# Patient Record
Sex: MALE | Race: WHITE
[De-identification: names, ages, dates, MRNs, and addresses within clinical notes are randomized per-mention and may not be internally consistent; named-entity substitution may affect disease eponyms.]

---

## 2019-10-23 ENCOUNTER — HOSPITAL ENCOUNTER (OUTPATIENT)
Dept: HOSPITAL 35 - PAIN | Age: 64
End: 2019-10-23
Attending: ANESTHESIOLOGY
Payer: COMMERCIAL

## 2019-10-23 VITALS — SYSTOLIC BLOOD PRESSURE: 131 MMHG | DIASTOLIC BLOOD PRESSURE: 88 MMHG

## 2019-10-23 VITALS — BODY MASS INDEX: 27.83 KG/M2 | WEIGHT: 210 LBS | HEIGHT: 72.99 IN

## 2019-10-23 DIAGNOSIS — M12.88: ICD-10-CM

## 2019-10-23 DIAGNOSIS — M54.5: Primary | ICD-10-CM

## 2019-10-23 DIAGNOSIS — Z88.8: ICD-10-CM

## 2019-10-23 DIAGNOSIS — Z79.899: ICD-10-CM

## 2019-10-23 NOTE — NUR
Pain Clinic Assessment:
 
1. History of Osteoarthritis:
SPINE
HANDS
   History of Rheumatoid Arthritis:
Not Applicable
 
2. Height: 6 ft. 1 in. 185.4 cm.
   Weight: 210.0 lb.  oz. 95.256 kg.
   Patient's BMI: 27.7
 
3. Vital Signs:
   BP: 131/88 Pulse: 95 Resp: 16
   Temp:  02 Sat: 97 ECG Mon:
 
4. Pain Intensity: 7
 
5. Fall Risk:
   Dizziness: N  Needs help standing or walking: N
   Fallen in the last 3 months: N
   Fall risk comments:
 
 
6. Patient on Blood Thinner: None
 
7. History of Hypertension: N
 
8. Opioid Therapy greater than 6 weeks: N
   Opiate Contract Signed:
 
9. Risk Assessment Tool Provided: LOW-0
 
10. Functional Assessment Tool: 35/70
 
11. Recreational Drug Use: Never Drug Type:
    Tobacco Use: Never Smoker Tobacco Type:
       Amount or Packs/day:  How Many Years:
    Alcohol Use: Yes  Frequency: Weekly Quant: 1

## 2019-11-12 ENCOUNTER — HOSPITAL ENCOUNTER (OUTPATIENT)
Dept: HOSPITAL 35 - PAIN | Age: 64
Discharge: HOME | End: 2019-11-12
Attending: ANESTHESIOLOGY
Payer: COMMERCIAL

## 2019-11-12 VITALS — HEIGHT: 72.99 IN | BODY MASS INDEX: 28.63 KG/M2 | WEIGHT: 216 LBS

## 2019-11-12 VITALS — SYSTOLIC BLOOD PRESSURE: 141 MMHG | DIASTOLIC BLOOD PRESSURE: 93 MMHG

## 2019-11-12 DIAGNOSIS — M51.36: ICD-10-CM

## 2019-11-12 DIAGNOSIS — G89.29: ICD-10-CM

## 2019-11-12 DIAGNOSIS — Z88.2: ICD-10-CM

## 2019-11-12 DIAGNOSIS — M47.896: ICD-10-CM

## 2019-11-12 DIAGNOSIS — Z88.0: ICD-10-CM

## 2019-11-12 DIAGNOSIS — M54.5: Primary | ICD-10-CM

## 2019-11-12 NOTE — NUR
Pain Clinic Assessment:
 
1. History of Osteoarthritis:
SPINE
HANDS
   History of Rheumatoid Arthritis:
Not Applicable
 
2. Height: 6 ft. 1 in. 185.4 cm.
   Weight: 216.0 lb.  oz. 97.977 kg.
   Patient's BMI: 28.5
 
3. Vital Signs:
   BP: 141/93 Pulse: 95 Resp: 16
   Temp:  02 Sat: 97 ECG Mon:
 
4. Pain Intensity: 7-WITH ACTIVITY
 
5. Fall Risk:
   Dizziness: N  Needs help standing or walking: N
   Fallen in the last 3 months: N
   Fall risk comments:
 
 
6. Patient on Blood Thinner: None
 
7. History of Hypertension: N
 
8. Opioid Therapy greater than 6 weeks: N
   Opiate Contract Signed:
 
9. Risk Assessment Tool Provided: LOW-0
 
10. Functional Assessment Tool: 35/70
 
11. Recreational Drug Use: Never Drug Type:
    Tobacco Use: Never Smoker Tobacco Type:
       Amount or Packs/day:  How Many Years:
    Alcohol Use: Yes  Frequency:  Quant:

## 2019-11-12 NOTE — HPC
[facility]
[place]   [address]                     PAIN MANAGEMENT CONSULTATION  
_______________________________________________________________________________
 
Name:       LIZETH QUAN CAMELIA                  Room #:                     REG BRENNON 
OLIVER.#:      [id number]                       Account #:      [id number]  
Admission:  10/23/19    Attend Phys:    Yonatan Mcelroy DO  
Discharge:              Date of Birth:  [birth year]  
                                                          Report #: [id number]
                                                                    [id number]   
_______________________________________________________________________________
THIS REPORT FOR:   //name//                          
 
CC: DAKOTAH Ordonez 
    Physician staff
 
DATE OF SERVICE:  10/23/2019
 
 
CHIEF COMPLAINT:  Axial back pain.
 
HISTORY OF PRESENT ILLNESS:  As you know, the patient is a very pleasant
64-year-old male, who has had a longstanding history of axial back pain.  His
pain exacerbated in 05/2018.  He denies any specific injury or trauma that may
have led to symptom development.  He sought evaluation through Dr. Jose Ordonez's
office with [facility] and it was determined that the patient may be
experiencing discogenic pain.  He was subsequently referred to our clinic to
undergo disk block at L4-L5.  If this is unsuccessful alleviating symptoms, then
undergo a disk block at L5-S1.  The patient indicates that his pain may have
initially started after a "bad golf swing" and has just progressively worsened. 
He has been referred to our service to discuss the disk block injections.  He
has undergone lumbar epidural injections with Dr. Ulises Haas, but with no
lasting benefit.  The patient was referred to our clinic then to undergo disk
blocks.
 
PAST MEDICAL HISTORY:
1.  Thyroid nodule.
2.  Hypercholesterolemia.
3.  Osteoarthritis.
4.  Hypogonadism.
5.  Erectile dysfunction.
6.  Parkinson's disease.
7.  Obstructive sleep apnea.
8.  Osteoarthritis.
9.  Chronic back pain.
 
PAST SURGICAL HISTORY:  Right shoulder surgery x 4, elbow surgery x 2 on the
right.
 
ALLERGIES:  SULFA AND PENICILLIN.
 
CURRENT MEDICATIONS:  Simvastatin 40 mg once a day, carbidopa/levodopa 25/100 mg
t.i.d., famotidine 40 mg per day, nabumetone 750 mg twice a day, vitamin B12
1000 mcg per day, vitamin D3 1000 units per day, Cialis 20 mg p.r.n.,
testosterone injectable once every 2 weeks, AndroGel pump used once a day.
 
 
 
[facility]
[address]
[place]   [address]                     PAIN MANAGEMENT CONSULTATION  
_______________________________________________________________________________
 
Name:       QUAN,LIZETHMARTÍN DENISE                  Room #:                     [facility].#:      [id number]                       Account #:      [id number]  
Admission:  10/23/19    Attend Phys:    Yonatan Mcelroy DO  
Discharge:              Date of Birth:  [birth year]  
                                                          Report #: [id number]
                                                                    [id number]   
_______________________________________________________________________________
 
REVIEW OF SYSTEMS:  Positive for wearing corrective eyewear, chronic sinus
problems with rhinitis, frequent urination, nocturia, change of force or stream
urination, chronic low back pain.  Heat and cold intolerance.  All other review
of systems negative per 12-point review of systems other than those listed in
history of present illness.
 
Pain impact score 35/70 indicating moderate interference of daily activities
secondary to pain.
 
IMAGING:  MRI lumbar spine obtained 08/20/2019 shows L1-L2 unremarkable, L2-L3
unremarkable,L3-L4 shows circumferential annular disk bulge, no significant
central canal neural foraminal stenosis, minimal facet arthropathy, L4-L5
degenerative end-plate changes, circumferential annular bulge, narrowing of the
neural foramen with encroachment on the lateral recess, minimal facet
hypertrophy, thecal sac measures 1.1 cm, L5-S1 circumferential disk bulge,
central annular tear without protrusion, bulging annulus abuts, the anterior
thecal sac causing narrowing of the neural foramen, right greater than left. 
There appears to be abutment of the exiting L5 nerve root on the right.  Thecal
sac measures 1.5 cm AP.
 
PHYSICAL EXAMINATION:
VITAL SIGNS:  Blood pressure 133/88, pulse is 95, respiratory rate 16 and
unlabored.  The patient is 97% on room air, height 6 feet 1 inch tall, weight
210 pounds, BMI calculated 27.7.
GENERAL:  Well-developed, well-nourished, well-hydrated 64-year-old male.  He
appears his stated age.  He is in no acute distress, awake, alert and oriented x
3.  Current pain score is rated at 7/10.
HEENT:  Normocephalic, atraumatic.  Pupils equal, round, reactive to light. 
Extraocular muscles are intact.  Sclerae nonicteric without injection.
NEUROLOGIC:  Cranial nerves 2-12 grossly intact.  Speech fluent.  The patient
deemed a good historian.
LUNGS:  Clear, no wheeze, rhonchi or rales.
CARDIOVASCULAR:  Regular.  No appreciable gallop, no rub.
ABDOMEN:  Soft, nontender, nondistended, normoactive bowel sounds.
EXTREMITIES:  Show no clubbing, no cyanosis, and no edema.
MUSCULOSKELETAL:  The patient does has a slight pill-rolling type movement in
his upper extremities, right greater than left.  He has equal and symmetrical
lower extremity strength 5/5, intact to light touch from L1 through S2
dermatomes.  Deep tendon reflexes 2+/4 at patella and Achilles.  Ankle clonus
negative.  Babinski is negative.  Romberg negative.  Station and gait is normal,
able to heel walk, toe walk and tandem walk.  Pain is elicited with lumbar
provocation testing including extension, rotation, lateral flexion.  Axial
loading causes intensification of pain.
 
ASSESSMENT:
 
 
 
[facility]
[address] [place]   [address]                     PAIN MANAGEMENT CONSULTATION  
_______________________________________________________________________________
 
Name:       LIZETH QUAN                  Room #:                     REG BRENNON LOPEZ#:      [id number]                       Account #:      [id number]  
Admission:  10/23/19    Attend Phys:    Yonatan Mcelroy DO  
Discharge:              Date of Birth:  [birth year]  
                                                          Report #: [id number]
                                                                    [id number]   
_______________________________________________________________________________
1.  Discogenic pain.
2.  Mild facet arthropathy of the lumbar spine.
3.  Chronic low back pain.
 
PLAN:
1.  Based on today's physical exam and history the patient has provided, the
description the patient uses in regard to pain, likely source of the patient's
pain is axial in nature.  The patient and I went over his MRI today, which I am
pleased to indicate shows no significant pathology.  There are some changes at
the L5-S1 level concerning for possible discogenic pain.  The patient sought
evaluation through [facility], who advised the patient to trial disk blocks at
the L5-S1 and the L4-L5 level to determine if his pain is generated from these
areas.  We have agreed to see the patient back in followup visit to undergo a
L5-S1 disk block at the patient's earliest convenience.  We will obtain the
equipment to have the patient undergo this procedure.  If the L5-S1 provide good
and prolonged benefit, then the patient will return to see Dr. Ordonez in regard to
surgical options.  If no improvement in symptoms with the L5-S1 disk block, then
we would perform an L4-L5 disk block within 1-week.  If this then provides
improvement in symptoms, he will return to Dr. Ordonez's office to discuss
treatment options for the L4-L5 level.
2.  No medication changes made at today's visit.  The patient will continue
current medical therapy as previously prescribed.
3.  We will have the patient return once we have gained all the procedural
equipment for the patient to undergo the disk block.  This does require a very
specialized needle set, we will obtain this as quickly as possible, have the
patient return to undergo the procedure once we have this equipment.
4.  We will keep you apprised of the patient's response to the disk blocks were
requested.  We are hopeful the patient will see good and prolonged benefit with
the disk block, which will help direct his further care.
5.  We wish to thank Dr. Jose Ordonez for the opportunity to see this patient in
consultation.  We will keep you apprised of his response to treatment and return
his care once we have completed the requested therapy.  Again, we wish to thank
Dr. Ordonez for the opportunity to see the patient in consultation.
 
 
 
 
 
 
 
 
 
 
 
  <ELECTRONICALLY SIGNED>
   By: Yonatan Mcelroy DO          
  11/12/19     1305
D: 11/08/19 0809                           _____________________________________
T: 11/08/19 0910                           Yonatan Mcelroy DO            /nt